# Patient Record
Sex: MALE | Race: WHITE | NOT HISPANIC OR LATINO | Employment: FULL TIME | URBAN - METROPOLITAN AREA
[De-identification: names, ages, dates, MRNs, and addresses within clinical notes are randomized per-mention and may not be internally consistent; named-entity substitution may affect disease eponyms.]

---

## 2020-07-31 ENCOUNTER — TELEPHONE (OUTPATIENT)
Dept: INTERNAL MEDICINE | Facility: CLINIC | Age: 55
End: 2020-07-31

## 2020-07-31 DIAGNOSIS — Z85.46 HISTORY OF PROSTATE CANCER: ICD-10-CM

## 2020-07-31 DIAGNOSIS — E55.9 VITAMIN D DEFICIENCY: Primary | ICD-10-CM

## 2020-07-31 DIAGNOSIS — E78.5 HYPERLIPIDEMIA, UNSPECIFIED HYPERLIPIDEMIA TYPE: ICD-10-CM

## 2020-07-31 DIAGNOSIS — Z12.5 SCREENING FOR PROSTATE CANCER: ICD-10-CM

## 2020-07-31 DIAGNOSIS — Z80.42 FAMILY HISTORY OF PROSTATE CANCER: ICD-10-CM

## 2020-07-31 DIAGNOSIS — I10 HYPERTENSION, UNSPECIFIED TYPE: ICD-10-CM

## 2020-07-31 DIAGNOSIS — E78.00 HIGH CHOLESTEROL: ICD-10-CM

## 2020-07-31 DIAGNOSIS — I38 VALVULAR HEART DISEASE: ICD-10-CM

## 2020-07-31 DIAGNOSIS — I71.9 AORTIC ANEURYSM WITHOUT RUPTURE, UNSPECIFIED PORTION OF AORTA (HCC): ICD-10-CM

## 2020-08-05 ENCOUNTER — RESULTS ENCOUNTER (OUTPATIENT)
Dept: INTERNAL MEDICINE | Facility: CLINIC | Age: 55
End: 2020-08-05

## 2020-08-05 DIAGNOSIS — E78.5 HYPERLIPIDEMIA, UNSPECIFIED HYPERLIPIDEMIA TYPE: ICD-10-CM

## 2020-08-05 DIAGNOSIS — I10 HYPERTENSION, UNSPECIFIED TYPE: ICD-10-CM

## 2020-08-05 DIAGNOSIS — I38 VALVULAR HEART DISEASE: ICD-10-CM

## 2020-08-05 DIAGNOSIS — Z12.5 SCREENING FOR PROSTATE CANCER: ICD-10-CM

## 2020-08-05 DIAGNOSIS — E55.9 VITAMIN D DEFICIENCY: ICD-10-CM

## 2020-08-06 LAB
25(OH)D3+25(OH)D2 SERPL-MCNC: 47.4 NG/ML (ref 30–100)
ALBUMIN SERPL-MCNC: 4.6 G/DL (ref 3.8–4.9)
ALBUMIN/GLOB SERPL: 1.7 {RATIO} (ref 1.2–2.2)
ALP SERPL-CCNC: 62 IU/L (ref 39–117)
ALT SERPL-CCNC: 23 IU/L (ref 0–44)
AST SERPL-CCNC: 24 IU/L (ref 0–40)
BASOPHILS # BLD AUTO: 0 X10E3/UL (ref 0–0.2)
BASOPHILS NFR BLD AUTO: 1 %
BILIRUB SERPL-MCNC: 0.6 MG/DL (ref 0–1.2)
BUN SERPL-MCNC: 16 MG/DL (ref 6–24)
BUN/CREAT SERPL: 14 (ref 9–20)
CALCIUM SERPL-MCNC: 9.6 MG/DL (ref 8.7–10.2)
CHLORIDE SERPL-SCNC: 100 MMOL/L (ref 96–106)
CHOLEST SERPL-MCNC: 129 MG/DL (ref 100–199)
CO2 SERPL-SCNC: 27 MMOL/L (ref 20–29)
CREAT SERPL-MCNC: 1.16 MG/DL (ref 0.76–1.27)
EOSINOPHIL # BLD AUTO: 0.4 X10E3/UL (ref 0–0.4)
EOSINOPHIL NFR BLD AUTO: 4 %
ERYTHROCYTE [DISTWIDTH] IN BLOOD BY AUTOMATED COUNT: 12.2 % (ref 11.6–15.4)
GLOBULIN SER CALC-MCNC: 2.7 G/DL (ref 1.5–4.5)
GLUCOSE SERPL-MCNC: 100 MG/DL (ref 65–99)
HCT VFR BLD AUTO: 43.7 % (ref 37.5–51)
HDLC SERPL-MCNC: 46 MG/DL
HGB BLD-MCNC: 14.8 G/DL (ref 13–17.7)
IMM GRANULOCYTES # BLD AUTO: 0 X10E3/UL (ref 0–0.1)
IMM GRANULOCYTES NFR BLD AUTO: 0 %
LDLC SERPL CALC-MCNC: 66 MG/DL (ref 0–99)
LYMPHOCYTES # BLD AUTO: 3.2 X10E3/UL (ref 0.7–3.1)
LYMPHOCYTES NFR BLD AUTO: 41 %
MCH RBC QN AUTO: 31.6 PG (ref 26.6–33)
MCHC RBC AUTO-ENTMCNC: 33.9 G/DL (ref 31.5–35.7)
MCV RBC AUTO: 93 FL (ref 79–97)
MONOCYTES # BLD AUTO: 0.5 X10E3/UL (ref 0.1–0.9)
MONOCYTES NFR BLD AUTO: 7 %
NEUTROPHILS # BLD AUTO: 3.8 X10E3/UL (ref 1.4–7)
NEUTROPHILS NFR BLD AUTO: 47 %
PLATELET # BLD AUTO: 231 X10E3/UL (ref 150–450)
POTASSIUM SERPL-SCNC: 4.4 MMOL/L (ref 3.5–5.2)
PROT SERPL-MCNC: 7.3 G/DL (ref 6–8.5)
PSA SERPL-MCNC: 2.1 NG/ML (ref 0–4)
RBC # BLD AUTO: 4.68 X10E6/UL (ref 4.14–5.8)
SODIUM SERPL-SCNC: 141 MMOL/L (ref 134–144)
TRIGL SERPL-MCNC: 86 MG/DL (ref 0–149)
VLDLC SERPL CALC-MCNC: 17 MG/DL (ref 5–40)
WBC # BLD AUTO: 7.9 X10E3/UL (ref 3.4–10.8)

## 2020-08-07 PROBLEM — Z86.79 H/O AORTIC ANEURYSM REPAIR: Status: ACTIVE | Noted: 2020-08-07

## 2020-08-07 PROBLEM — F32.A ANXIETY AND DEPRESSION: Status: ACTIVE | Noted: 2020-08-07

## 2020-08-07 PROBLEM — Z87.74 H/O BICUSPID AORTIC VALVE: Status: ACTIVE | Noted: 2020-08-07

## 2020-08-07 PROBLEM — Z98.890 H/O AORTIC ANEURYSM REPAIR: Status: ACTIVE | Noted: 2020-08-07

## 2020-08-07 PROBLEM — F41.9 ANXIETY AND DEPRESSION: Status: ACTIVE | Noted: 2020-08-07

## 2020-08-07 PROBLEM — I35.1 AORTIC INSUFFICIENCY: Status: ACTIVE | Noted: 2020-08-07

## 2020-08-07 NOTE — PROGRESS NOTES
Subjective   Jaret Olivares is a 55 y.o. male.     Chief Complaint   Patient presents with   • Hypertension   • Hyperlipidemia       History of Present Illness   This is a 55-year-old gentleman with a past medical history significant for aortic aneurysm repair as well as bicuspid aortic valve, history of mild aortic insufficiency, depression and anxiety, family history of prostate cancer who presents for a follow-up on labs and medications.  Patient states that he has had some recent breathing issues, he states that he is breathing through his mouth frequently and feels that this causes his abdomen to get somewhat bloated and uncomfortable.  He has not had any recent exercise and states that he really did not get a lot of exercise prior to COVID.  Mood is remained stable on his current dose of duloxetine at 20 mg once a day.  The patient denies any chest pain, no shortness of air or heart palpitations.  No other new voiced complaints.    The following portions of the patient's history were reviewed and updated as appropriate: allergies, current medications, past family history, past medical history, past social history, past surgical history and problem list.    Depression Screen:  PHQ-2/PHQ-9 Depression Screening 8/13/2020   Little interest or pleasure in doing things 1   Feeling down, depressed, or hopeless 1   Trouble falling or staying asleep, or sleeping too much 1   Feeling tired or having little energy 1   Poor appetite or overeating 0   Feeling bad about yourself - or that you are a failure or have let yourself or your family down 1   Trouble concentrating on things, such as reading the newspaper or watching television 1   Moving or speaking so slowly that other people could have noticed. Or the opposite - being so fidgety or restless that you have been moving around a lot more than usual 0   Thoughts that you would be better off dead, or of hurting yourself in some way 0   Total Score 6   If you checked off  any problems, how difficult have these problems made it for you to do your work, take care of things at home, or get along with other people? Not difficult at all       Assessment/Plan   Jaret was seen today for hypertension and hyperlipidemia.    Diagnoses and all orders for this visit:    H/O aortic aneurysm repair    H/O bicuspid aortic valve    Aortic valve insufficiency, etiology of cardiac valve disease unspecified    Anxiety and depression    Benign essential hypertension    Mixed hyperlipidemia    Family history of prostate cancer    Overweight       #1.  Benign essential hypertension-blood pressure is well controlled on exam, would recommend that we continue current treatment.  2.  Hyperlipidemia-LDL goal is less than 70, total cholesterol 129, HDL 46, triglycerides 86 and LDL 66, patient is currently on atorvastatin 20 mg, recommend continuing current treatment.  3.  Anxiety with depression-patient is currently on duloxetine 30 mg once daily, stable on current treatment which we will continue at his current dose.  4.  History of an aortic aneurysm repair/bicuspid aortic valve-not requiring any aortic valve replacement, no aortic stenosis per echocardiogram-mild aortic insufficiency-patient remains currently asymptomatic and follows routinely with Dr. Escamilla, echocardiogram has been postponed secondary to COVID, patient does have an upcoming appointment with Dr. Escamilla, and will have a copy of his echo and note sent to our office.  5.  Family history of prostate cancer-last PSA was 1.8, PSA on today's labs were 2.1, we discussed the option of referral to urology due to patient's persistent concerns, patient would prefer to recheck his PSA at follow-up.  6.  Overweight-BMI is 28, would recommend diet and exercise to obtain a BMI ideally less than 25.  #7.  Chronic mouth breathing-we discussed possibly using a nasal steroid over-the-counter such as Flonase or Nasacort 1 spray in each nostril twice a day  "to see if this helps to alleviate any nasal obstruction that might be contributing to his chronic mouth breathing.    6-month follow-up with lab-CMP, lipid, vitamin D, PSA      History reviewed. No pertinent past medical history.    Past Surgical History:   Procedure Laterality Date   • COLONOSCOPY     • HEART REPAIR TUMOR OR ANEURYSM         History reviewed. No pertinent family history.    Social History     Socioeconomic History   • Marital status: Single     Spouse name: Not on file   • Number of children: Not on file   • Years of education: Not on file   • Highest education level: Not on file   Tobacco Use   • Smoking status: Never Smoker   • Smokeless tobacco: Never Used       Current Outpatient Medications   Medication Sig Dispense Refill   • Aspirin Buf,CaCarb-MgCarb-MgO, 81 MG tablet Take 81 mg by mouth Daily.     • atorvastatin (LIPITOR) 20 MG tablet Take 20 mg by mouth Daily.     • DULoxetine (CYMBALTA) 20 MG capsule Take 20 mg by mouth Daily.     • lisinopril (PRINIVIL,ZESTRIL) 5 MG tablet Take 5 mg by mouth Daily.       No current facility-administered medications for this visit.        Review of Systems   Respiratory: Positive for shortness of breath.        Objective   /68   Pulse 88   Temp 98 °F (36.7 °C)   Ht 175.3 cm (69\")   Wt 88.5 kg (195 lb)   SpO2 98%   BMI 28.80 kg/m²     Physical Exam  Constitutional:  Patient appears well-developed, well-nourished and in no acute distress.  Neck:  The neck is supple and symmetric.  The trachea is midline with no masses.  Exam of the thyroid reveals no thyromegaly or masses.  Pulmonary:  Respiratory effort is normal with no increased work of breathing or respiratory distress.  Lungs are clear to auscultation bilaterally.  Cardiovascular:  Auscultation of the heart rate and rhythm is normal.  S1 and S2 are normal without rubs, gallops or murmurs.  Carotid pulses are 2+ bilaterally without bruit.  Examination of the extremities reveals no " edema.  Neurologic:  Cranial nerves II-XII are grossly intact.  Psychiatric:  Patient's judgment and insight are unimpaired.  Patient's mood and affect are normal.      Recent Results (from the past 2016 hour(s))   CBC & Differential    Collection Time: 08/05/20  8:07 AM   Result Value Ref Range    WBC 7.9 3.4 - 10.8 x10E3/uL    RBC 4.68 4.14 - 5.80 x10E6/uL    Hemoglobin 14.8 13.0 - 17.7 g/dL    Hematocrit 43.7 37.5 - 51.0 %    MCV 93 79 - 97 fL    MCH 31.6 26.6 - 33.0 pg    MCHC 33.9 31.5 - 35.7 g/dL    RDW 12.2 11.6 - 15.4 %    Platelets 231 150 - 450 x10E3/uL    Neutrophil Rel % 47 Not Estab. %    Lymphocyte Rel % 41 Not Estab. %    Monocyte Rel % 7 Not Estab. %    Eosinophil Rel % 4 Not Estab. %    Basophil Rel % 1 Not Estab. %    Neutrophils Absolute 3.8 1.4 - 7.0 x10E3/uL    Lymphocytes Absolute 3.2 (H) 0.7 - 3.1 x10E3/uL    Monocytes Absolute 0.5 0.1 - 0.9 x10E3/uL    Eosinophils Absolute 0.4 0.0 - 0.4 x10E3/uL    Basophils Absolute 0.0 0.0 - 0.2 x10E3/uL    Immature Granulocyte Rel % 0 Not Estab. %    Immature Grans Absolute 0.0 0.0 - 0.1 x10E3/uL   Comprehensive Metabolic Panel    Collection Time: 08/05/20  8:07 AM   Result Value Ref Range    Glucose 100 (H) 65 - 99 mg/dL    BUN 16 6 - 24 mg/dL    Creatinine 1.16 0.76 - 1.27 mg/dL    eGFR Non African Am 70 >59 mL/min/1.73    eGFR African Am 81 >59 mL/min/1.73    BUN/Creatinine Ratio 14 9 - 20    Sodium 141 134 - 144 mmol/L    Potassium 4.4 3.5 - 5.2 mmol/L    Chloride 100 96 - 106 mmol/L    Total CO2 27 20 - 29 mmol/L    Calcium 9.6 8.7 - 10.2 mg/dL    Total Protein 7.3 6.0 - 8.5 g/dL    Albumin 4.6 3.8 - 4.9 g/dL    Globulin 2.7 1.5 - 4.5 g/dL    A/G Ratio 1.7 1.2 - 2.2    Total Bilirubin 0.6 0.0 - 1.2 mg/dL    Alkaline Phosphatase 62 39 - 117 IU/L    AST (SGOT) 24 0 - 40 IU/L    ALT (SGPT) 23 0 - 44 IU/L   Lipid panel    Collection Time: 08/05/20  8:07 AM   Result Value Ref Range    Total Cholesterol 129 100 - 199 mg/dL    Triglycerides 86 0 - 149 mg/dL     HDL Cholesterol 46 >39 mg/dL    VLDL Cholesterol 17 5 - 40 mg/dL    LDL Cholesterol  66 0 - 99 mg/dL   Vitamin D 25 hydroxy    Collection Time: 08/05/20  8:07 AM   Result Value Ref Range    25 Hydroxy, Vitamin D 47.4 30.0 - 100.0 ng/mL   PSA SCREENING    Collection Time: 08/05/20  8:07 AM   Result Value Ref Range    PSA 2.1 0.0 - 4.0 ng/mL

## 2020-08-11 PROBLEM — E66.3 OVERWEIGHT: Status: ACTIVE | Noted: 2020-08-11

## 2020-08-11 PROBLEM — Z80.42 FAMILY HISTORY OF PROSTATE CANCER: Status: ACTIVE | Noted: 2020-08-11

## 2020-08-11 PROBLEM — I10 BENIGN ESSENTIAL HYPERTENSION: Status: ACTIVE | Noted: 2020-08-11

## 2020-08-11 PROBLEM — E78.2 MIXED HYPERLIPIDEMIA: Status: ACTIVE | Noted: 2020-08-11

## 2020-08-13 ENCOUNTER — OFFICE VISIT (OUTPATIENT)
Dept: INTERNAL MEDICINE | Facility: CLINIC | Age: 55
End: 2020-08-13

## 2020-08-13 VITALS
DIASTOLIC BLOOD PRESSURE: 68 MMHG | HEIGHT: 69 IN | BODY MASS INDEX: 28.88 KG/M2 | TEMPERATURE: 98 F | OXYGEN SATURATION: 98 % | WEIGHT: 195 LBS | HEART RATE: 88 BPM | SYSTOLIC BLOOD PRESSURE: 120 MMHG

## 2020-08-13 DIAGNOSIS — I10 BENIGN ESSENTIAL HYPERTENSION: ICD-10-CM

## 2020-08-13 DIAGNOSIS — E78.2 MIXED HYPERLIPIDEMIA: ICD-10-CM

## 2020-08-13 DIAGNOSIS — Z80.42 FAMILY HISTORY OF PROSTATE CANCER: ICD-10-CM

## 2020-08-13 DIAGNOSIS — Z86.79 H/O AORTIC ANEURYSM REPAIR: Primary | ICD-10-CM

## 2020-08-13 DIAGNOSIS — F32.A ANXIETY AND DEPRESSION: ICD-10-CM

## 2020-08-13 DIAGNOSIS — F41.9 ANXIETY AND DEPRESSION: ICD-10-CM

## 2020-08-13 DIAGNOSIS — E66.3 OVERWEIGHT: ICD-10-CM

## 2020-08-13 DIAGNOSIS — I35.1 AORTIC VALVE INSUFFICIENCY, ETIOLOGY OF CARDIAC VALVE DISEASE UNSPECIFIED: ICD-10-CM

## 2020-08-13 DIAGNOSIS — Z98.890 H/O AORTIC ANEURYSM REPAIR: Primary | ICD-10-CM

## 2020-08-13 DIAGNOSIS — Z87.74 H/O BICUSPID AORTIC VALVE: ICD-10-CM

## 2020-08-13 PROBLEM — R06.5 CHRONIC MOUTH BREATHING: Status: ACTIVE | Noted: 2020-08-13

## 2020-08-13 PROCEDURE — 99214 OFFICE O/P EST MOD 30 MIN: CPT | Performed by: INTERNAL MEDICINE

## 2020-08-13 RX ORDER — ATORVASTATIN CALCIUM 20 MG/1
20 TABLET, FILM COATED ORAL DAILY
COMMUNITY
End: 2020-10-14 | Stop reason: SDUPTHER

## 2020-08-13 RX ORDER — LISINOPRIL 5 MG/1
5 TABLET ORAL DAILY
COMMUNITY

## 2020-08-13 RX ORDER — DULOXETIN HYDROCHLORIDE 20 MG/1
20 CAPSULE, DELAYED RELEASE ORAL DAILY
COMMUNITY
End: 2020-10-16 | Stop reason: CLARIF

## 2020-08-13 NOTE — PATIENT INSTRUCTIONS
-Diet, low carbohydrate, high-protein (diets that are low in carbohydrates and high in protein are very popular for weight loss)  -Exercise 30 to 45 minutes, 4-5 times per week  -Giving encouragement to exercise (we encourage all of her patients to exercise regularly 30 minutes of exercise 5 or more days per week)  -Special dietary instruction-we recommend you modify your diet to achieve and maintain a healthy weight.  Being overweight may increase your risk for developing health problems such as diabetes, heart disease and cancer.  Avoid high fat foods and eat a balanced diet rich in fruits and vegetables.  The combination of a reduced calorie diet and increased physical activity is recommended.

## 2020-10-14 RX ORDER — ATORVASTATIN CALCIUM 20 MG/1
20 TABLET, FILM COATED ORAL DAILY
Qty: 90 TABLET | Refills: 1 | Status: SHIPPED | OUTPATIENT
Start: 2020-10-14

## 2020-10-16 DIAGNOSIS — F41.9 ANXIETY AND DEPRESSION: Primary | ICD-10-CM

## 2020-10-16 DIAGNOSIS — F32.A ANXIETY AND DEPRESSION: Primary | ICD-10-CM

## 2020-10-16 RX ORDER — DULOXETIN HYDROCHLORIDE 30 MG/1
CAPSULE, DELAYED RELEASE ORAL
COMMUNITY
Start: 2020-09-10 | End: 2020-10-16 | Stop reason: SDUPTHER

## 2020-10-16 RX ORDER — DULOXETIN HYDROCHLORIDE 30 MG/1
30 CAPSULE, DELAYED RELEASE ORAL DAILY
Qty: 90 CAPSULE | Refills: 1 | Status: SHIPPED | OUTPATIENT
Start: 2020-10-16

## 2021-03-24 ENCOUNTER — BULK ORDERING (OUTPATIENT)
Dept: CASE MANAGEMENT | Facility: OTHER | Age: 56
End: 2021-03-24

## 2021-03-24 DIAGNOSIS — Z23 IMMUNIZATION DUE: ICD-10-CM

## 2021-11-16 ENCOUNTER — IMMUNIZATION (OUTPATIENT)
Dept: VACCINE CLINIC | Facility: HOSPITAL | Age: 56
End: 2021-11-16

## 2021-11-16 PROCEDURE — 0004A ADM SARSCOV2 30MCG/0.3ML BOOSTER: CPT | Performed by: INTERNAL MEDICINE

## 2021-11-16 PROCEDURE — 91300 HC SARSCOV02 VAC 30MCG/0.3ML IM: CPT | Performed by: INTERNAL MEDICINE
